# Patient Record
Sex: FEMALE | Race: WHITE | NOT HISPANIC OR LATINO | URBAN - METROPOLITAN AREA
[De-identification: names, ages, dates, MRNs, and addresses within clinical notes are randomized per-mention and may not be internally consistent; named-entity substitution may affect disease eponyms.]

---

## 2018-01-19 ENCOUNTER — OFFICE VISIT - RIVER FALLS (OUTPATIENT)
Dept: FAMILY MEDICINE | Facility: CLINIC | Age: 58
End: 2018-01-19

## 2018-01-19 ASSESSMENT — MIFFLIN-ST. JEOR: SCORE: 1135.14

## 2018-01-23 ENCOUNTER — OFFICE VISIT - RIVER FALLS (OUTPATIENT)
Dept: FAMILY MEDICINE | Facility: CLINIC | Age: 58
End: 2018-01-23

## 2018-01-23 ASSESSMENT — MIFFLIN-ST. JEOR: SCORE: 1143.3

## 2022-02-12 VITALS
BODY MASS INDEX: 22.33 KG/M2 | WEIGHT: 130.8 LBS | HEIGHT: 64 IN | WEIGHT: 129 LBS | DIASTOLIC BLOOD PRESSURE: 66 MMHG | SYSTOLIC BLOOD PRESSURE: 124 MMHG | TEMPERATURE: 97.4 F | DIASTOLIC BLOOD PRESSURE: 88 MMHG | BODY MASS INDEX: 22.02 KG/M2 | HEIGHT: 64 IN | TEMPERATURE: 98.1 F | HEART RATE: 96 BPM | HEART RATE: 88 BPM | SYSTOLIC BLOOD PRESSURE: 138 MMHG

## 2022-02-15 NOTE — PROGRESS NOTES
Patient:   JOSEPH RODRIGUEZ            MRN: [id number]            FIN: [id number]               Age:   57 years     Sex:  Female     :  1960   Associated Diagnoses:   Right serous otitis media   Author:   Herman Francois MD      Chief Complaint   2018 6:20 PM CST    c/o R ear pain continuing      History of Present Illness   Patient with ongoing right ear pain.  Severe sharp and stabbing   Dizziness has been resolved after being treated for dehdyration  No fevers.       Health Status   Allergies:    Allergic Reactions (All)  Severity Not Documented  Aspirin (No reactions were documented)   Problem list:    All Problems  No Chronic Problems / Cerner NKP      Histories   Past Medical History:    No active or resolved past medical history items have been selected or recorded.   Procedure history:    Breast lumpectomy ([id number]) in  at 44 Years.      Physical Examination   Vital Signs   2018 6:20 PM CST Temperature Tympanic 97.4 DegF  LOW    Peripheral Pulse Rate 96 bpm    Pulse Site Radial artery    HR Method Manual    Systolic Blood Pressure 124 mmHg    Diastolic Blood Pressure 66 mmHg    Mean Arterial Pressure 85 mmHg    BP Site Left arm    BP Method Manual      Measurements from flowsheet : Measurements   2018 6:20 PM CST Height Measured - Standard 64 in    Weight Measured - Standard 130.8 lb    BSA 1.64 m2    Body Mass Index 22.45 kg/m2      General:  No acute distress.    Eye:  Pupils are equal, round and reactive to light, Normal conjunctiva.    HENT:  Normocephalic, Oral mucosa is moist, No pharyngeal erythema.         Ear: Left ear, Within normal limits.         Ear: Right ear, Tympanic membrane ( Bulging, Not erythematous ).    Neck:  Supple, Non-tender, No lymphadenopathy.    Respiratory:  Lungs are clear to auscultation.    Cardiovascular:  Normal rate, Regular rhythm.       Review / Management   Results review:  Lab results   2018 3:56 PM CST Sed Rate TR 18 mm/hr   2018  2:55 PM CST WBC TR 11.0 x10^3/uL    RBC TR 5.67 x10^6/uL    Hgb TR 17.1 g/dL    Hct TR 52.6 %    MCV TR 92.9 fL    MCH TR 30.1 pg    MCHC TR 32.4 gm/dL    RDW TR 12.7 %    Platelet  x10^3/uL   1/19/2018 2:49 PM CST Urine Color Urine Dipstick Yellow    Urine Appearance Urine Dipstick Clear    pH Urine Dipstick 5.5    Specific Gravity Urine Dipstick 1.025    Glucose Urine Dipstick Negative    Bilirubin Urine Dipstick Moderate    Ketones Urine Dipstick 3+    Blood Urine Dipstick Small    Protein Urine Dipstick 30 mg/dl    Nitrite Urine Dipstick Negative    Leukocyte Esterase Urine Dipstick Negative    Urobilinogen Urine Dipstick 0.2 mg/dl    POC Test Comments POC Test Comments   .    ER notes and CT reviewed.         Impression and Plan   Diagnosis     Right serous otitis media ([id number] H65.91).     Course:  Not improving.    Plan:  Patient will try antibiotics. If not resolving, next step is MRI. Patient will call if not resolving..    Orders     Orders   Pharmacy:  amoxicillin 500 mg oral capsule (Prescribe): 2 cap(s) ( 1,000 mg ), PO, bid, x 10 day(s), # 40 cap(s), 0 Refill(s), Type: Maintenance, Pharmacy: [facility] [id number], 2 cap(s) po bid,x10 day(s).

## 2022-02-15 NOTE — PROGRESS NOTES
Patient:   JOSEPH RODRIGUEZ            MRN: [id number]            FIN: [id number]               Age:   57 years     Sex:  Female     :  1960   Associated Diagnoses:   Headache; Fever; Moderate dehydration   Author:   Herman Francois MD      Chief Complaint   2018 2:11 PM CST    c/o right sided ear pain, also having pain in her right side abdomen; states that she had the flu -Wednesday and now getting pains on her right side      History of Present Illness   Patient is a 57 year old new to our clinic who presents with illness over the past week.   Reports mild feelings of being unwell one week ago, but 5 days ago worsened with suspected high fevers and extreme body aches.  Denies significant cough or congestion. Denies vomiting or diarrhea.  Reports that she didn't get out of bed for four days.  Wednesday fever broke and she tried to get up but found she was extremely dizzy, unable to walk.  Noticed sharp pain in right side of head and into right ear.  Has felt nauseous. Very little appetite. Has tried to drink but has not been able to do easily.  Decreased voiding. No dysuira.   Has noticed right lower quadrant abdominal fullness/discomfort    Denies any significant medical history. No daily medication. No prior episodes      Review of Systems   Constitutional:  Fever, Chills, fevers seem to be improving.    Eye:  Negative.    Ear/Nose/Mouth/Throat:  Negative except as documented in history of present illness, No nasal congestion.    Respiratory:  Negative.    Cardiovascular:  Negative.    Gastrointestinal:  Nausea, poor appetite.    Genitourinary:  Negative.    Hematology/Lymphatics:  Negative.    Endocrine:  Negative.    Immunologic:  Negative.    Musculoskeletal:  Negative except as documented in history of present illness.    Integumentary:  Negative.       Health Status   Allergies:    Allergic Reactions (All)  Severity Not Documented  Aspirin (No reactions were documented)   Medications: no daily  medicaitons   Problem list:    All Problems  No Chronic Problems / Cerner NKP      Histories   Past Medical History: no past medical history per patient   Family History:    Entire family history is negative.   Procedure history:    Breast lumpectomy ([id number]) in 2004 at 44 Years.   Social History:        Tobacco Assessment            Smoker, current status unknown, Cigarettes      Employment and Education Assessment            Employed, Work/School description: .      Home and Environment Assessment            Marital status: .  Spouse/Partner name: Chad Abdullahi.  Lives with Spouse.  0 children.  Living               situation: Home/Independent.      Nutrition and Health Assessment            Type of diet: Regular, Vegetarian.  Caffeine intake amount: Coffee Daily.      Exercise and Physical Activity Assessment            Exercise frequency: Never.                     Comments:                      01/19/2018 - Adin BERNARD, Maria Luisa Johnson; active lifstyle        Physical Examination   Vital Signs   1/19/2018 2:11 PM CST Temperature Tympanic 98.1 DegF    Peripheral Pulse Rate 88 bpm    Pulse Site Radial artery    HR Method Manual    Systolic Blood Pressure 138 mmHg  HI    Diastolic Blood Pressure 88 mmHg  HI    Mean Arterial Pressure 105 mmHg    BP Site Right arm    BP Method Manual      Measurements from flowsheet : Measurements   1/19/2018 2:11 PM CST Height Measured - Standard 64 in    Weight Measured - Standard 129 lb    BSA 1.62 m2    Body Mass Index 22.14 kg/m2      General:  Alert and oriented, Moderate distress, has a hard time lifting her head up, appears uncomfortable.    Eye:  Pupils are equal, round and reactive to light, Normal conjunctiva.    HENT:  Normocephalic, Tympanic membranes are clear, oral mucosa is dry, patient complains of tenderness with palpation over right mastoid, no redness or rash seen.    Neck:  Supple, Non-tender, No lymphadenopathy, No thyromegaly.     Respiratory:  Lungs are clear to auscultation, Respirations are non-labored.    Cardiovascular:  Normal rate, Regular rhythm.    Gastrointestinal:  Soft, Non-distended, Normal bowel sounds, mild RLQ tenderness, no rebound or guarding.    Genitourinary:  No costovertebral angle tenderness.    Integumentary:  Warm, Dry.    Psychiatric:  Cooperative.       Review / Management   Results review:  Lab results   1/19/2018 3:56 PM CST Sed Rate TR 18 mm/hr   1/19/2018 2:55 PM CST WBC TR 11.0 x10^3/uL    RBC TR 5.67 x10^6/uL    Hgb TR 17.1 g/dL    Hct TR 52.6 %    MCV TR 92.9 fL    MCH TR 30.1 pg    MCHC TR 32.4 gm/dL    RDW TR 12.7 %    Platelet  x10^3/uL   1/19/2018 2:49 PM CST Urine Color Dipstick Yellow    Urine Appearance Clear    Urine pH Dipstick 5.5    Urine Specific Gravity Dipstick 1.025    Urine Glucose Dipstick Negative    Urine Bilirubin Dipstick Moderate    Urine Ketones Dipstick 3+    Urine Blood Dipstick Small    Urine Protein Dipstick 30 mg/dl    Urine Nitrite Dipstick Negative    Urine Leukocyte Esterase Dipstick Negative    Urine Urobilinogen Dipstick 0.2 mg/dl    POC Test Comments POC Test Comments   .       Impression and Plan   Diagnosis     Headache ([id number] R51).     Moderate dehydration (ARY04-WD E86.0).     Fever ([id number]-DG R50.9).     Given dehydration and significant discomfort, recommended to patient and  that she go to the ER for evaluation and IV fluids. Report given to Dr. Cao and results sent along with patient. She and her  are agreeable with plan.